# Patient Record
Sex: FEMALE | Race: AMERICAN INDIAN OR ALASKA NATIVE
[De-identification: names, ages, dates, MRNs, and addresses within clinical notes are randomized per-mention and may not be internally consistent; named-entity substitution may affect disease eponyms.]

---

## 2018-02-12 ENCOUNTER — HOSPITAL ENCOUNTER (EMERGENCY)
Dept: HOSPITAL 31 - C.ER | Age: 68
Discharge: HOME | End: 2018-02-12
Payer: MEDICARE

## 2018-02-12 VITALS
RESPIRATION RATE: 20 BRPM | SYSTOLIC BLOOD PRESSURE: 130 MMHG | OXYGEN SATURATION: 100 % | TEMPERATURE: 98.1 F | HEART RATE: 84 BPM | DIASTOLIC BLOOD PRESSURE: 77 MMHG

## 2018-02-12 VITALS — BODY MASS INDEX: 22.1 KG/M2

## 2018-02-12 DIAGNOSIS — R06.02: ICD-10-CM

## 2018-02-12 DIAGNOSIS — R53.1: Primary | ICD-10-CM

## 2018-02-12 LAB
ALBUMIN SERPL-MCNC: 4 G/DL (ref 3.5–5)
ALBUMIN/GLOB SERPL: 0.9 {RATIO} (ref 1–2.1)
ALT SERPL-CCNC: 19 U/L (ref 9–52)
APTT BLD: 27 SECONDS (ref 21–34)
AST SERPL-CCNC: 22 U/L (ref 14–36)
BASOPHILS # BLD AUTO: 0 K/UL (ref 0–0.2)
BASOPHILS NFR BLD: 0 % (ref 0–2)
BUN SERPL-MCNC: 19 MG/DL (ref 7–17)
CALCIUM SERPL-MCNC: 8.6 MG/DL (ref 8.6–10.4)
D DIMER: 2600 NG/MLDDU (ref 0–243)
EOSINOPHIL # BLD AUTO: 0.2 K/UL (ref 0–0.7)
EOSINOPHIL NFR BLD: 2 % (ref 0–4)
EOSINOPHIL NFR BLD: 2 % (ref 0–4)
ERYTHROCYTE [DISTWIDTH] IN BLOOD BY AUTOMATED COUNT: 15.3 % (ref 11.5–14.5)
GFR NON-AFRICAN AMERICAN: 55
HGB BLD-MCNC: 13.4 G/DL (ref 11–16)
INR PPP: 1.1
LYMPHOCYTE: 7 % (ref 20–40)
LYMPHOCYTES # BLD AUTO: 0.5 K/UL (ref 1–4.3)
LYMPHOCYTES NFR BLD AUTO: 6 % (ref 20–40)
MCH RBC QN AUTO: 23 PG (ref 27–31)
MCHC RBC AUTO-ENTMCNC: 32.3 G/DL (ref 33–37)
MCV RBC AUTO: 71.2 FL (ref 81–99)
MONOCYTE: 10 % (ref 0–10)
MONOCYTES # BLD: 0.9 K/UL (ref 0–0.8)
MONOCYTES NFR BLD: 11 % (ref 0–10)
MYELOCYTES NFR BLD: 1 % (ref 0–0)
NEUTROPHILS # BLD: 6.3 K/UL (ref 1.8–7)
NEUTROPHILS NFR BLD AUTO: 80 % (ref 50–75)
NEUTROPHILS NFR BLD AUTO: 81 % (ref 50–75)
NRBC BLD AUTO-RTO: 0 % (ref 0–2)
PLATELET # BLD EST: NORMAL 10*3/UL
PLATELET # BLD: 159 K/UL (ref 130–400)
PMV BLD AUTO: 11.2 FL (ref 7.2–11.7)
PROTHROMBIN TIME: 12 SECONDS (ref 9.7–12.2)
RBC # BLD AUTO: 5.84 MIL/UL (ref 3.8–5.2)
TOTAL CELLS COUNTED BLD: 100
WBC # BLD AUTO: 7.7 K/UL (ref 4.8–10.8)

## 2018-02-12 PROCEDURE — 93005 ELECTROCARDIOGRAM TRACING: CPT

## 2018-02-12 PROCEDURE — 80053 COMPREHEN METABOLIC PANEL: CPT

## 2018-02-12 PROCEDURE — 85378 FIBRIN DEGRADE SEMIQUANT: CPT

## 2018-02-12 PROCEDURE — 85730 THROMBOPLASTIN TIME PARTIAL: CPT

## 2018-02-12 PROCEDURE — 99285 EMERGENCY DEPT VISIT HI MDM: CPT

## 2018-02-12 PROCEDURE — 85025 COMPLETE CBC W/AUTO DIFF WBC: CPT

## 2018-02-12 PROCEDURE — 87804 INFLUENZA ASSAY W/OPTIC: CPT

## 2018-02-12 PROCEDURE — 71275 CT ANGIOGRAPHY CHEST: CPT

## 2018-02-12 PROCEDURE — 85610 PROTHROMBIN TIME: CPT

## 2018-02-12 PROCEDURE — 71045 X-RAY EXAM CHEST 1 VIEW: CPT

## 2018-02-12 NOTE — CT
CTA chest PE protocol 



Indication: SOB  r/o PE



Technique: 



Contiguous axial images were obtained through the chest with 

intravenous contrast enhancement. Sagittal and coronal 

reconstructions were generated and reviewed. 



This CT exam was performed using 1 or more of the following dose 

reduction techniques: Automated exposure control, adjustment of the 

MAA and/or kV according to patient size, and/or use of iterative 

reconstruction technique. 



IV Contrast: 100 mL Visipaque 320 







Radiation dose (DLP): 491.08 MGy-cm. 



Comparison: Chest x-ray performed 2/12/18 



Findings: 



Visualized portions of the inferior thyroid gland appear 

heterogeneous and enlarged with multiple probable nodules. 



The mediastinal and hilar vascular structures appear within normal 

limits. Cardiomegaly. Median sternotomy wires. 



No large central or segmental pulmonary embolus evident.



No focal consolidation. No pleural effusion. No pneumothorax. 3 mm 

probable right apical calcified granuloma. No suspicious pulmonary 

nodules measuring greater than 5 mm. 



Limited visualized portions of the upper abdomen ; 7 mm probable 

splenule.  Otherwise unremarkable unenhanced appearance. 



No acute osseous abnormality is detected. 



Impression: 



Heterogeneous enlarged appearance of the included inferior thyroid 

gland with numerous probable nodules. Correlation with thyroid 

function tests and outpatient thyroid ultrasound recommended for 

further evaluation if indicated. 



No large central or segmental pulmonary embolus identified. 



Cardiomegaly. 



3 mm right apical calcified granuloma.

## 2018-02-12 NOTE — RAD
HISTORY:

shortness of breath  



COMPARISON:

Chest x-ray performed 8/15/16 



TECHNIQUE:

Chest, one view.



FINDINGS:

Examination limited by habitus.



LUNGS:

Small left pleural effusion.  Bibasilar atelectasis/ infiltrates. No 

definite pneumothorax.



Please note that chest x-ray has limited sensitivity for the 

detection of pulmonary masses.



CARDIOVASCULAR:

Median sternotomy wires.  Cardiomegaly.



OSSEOUS STRUCTURES:

 Osseous demineralization.  Degenerative changes.



VISUALIZED UPPER ABDOMEN:

Tenting of the right hemidiaphragm.



OTHER FINDINGS:

None.



IMPRESSION:

Cardiomegaly.  Small left pleural effusion.  Bibasilar atelectasis/ 

infiltrates. Tenting of the right hemidiaphragm.

## 2018-02-12 NOTE — C.PDOC
History Of Present Illness





<Corina Shaw - Last Filed: 18 16:37>





<Megan Dias - Last Filed: 18 14:09>





CC: "weakness"





HPI: 68 year old female with past medical history of mitral valve replacement, 

HTN, and cranial hematoma who presents to the ED for weakness.  She states she 

started to have a general feeling of weakness.  She states what prompted her to 

come to the ED today is that she could not get herself to complete her morning 

routine such as brushing her teeth or taking a shower as she felt very weak.  

She states she has nasal congestion, runny nose, shortness of breath, coughing 

with clear phlegm. She states she started to feel short of breath on Thursday.  

She went and saw Dr. Pelayo who stated for her to stop taking Amlodipine and to 

start taking Valsartan.  She says she can usually walk about a block and half 

with her cane and she takes her time walking up the stairs because of her hip 

revisions and replacements.  She also states she has a headache with pain 

behind her eyes that started on Friday.  She states the pain has been constant 

since Friday and feels like a dull pain.  She denies blurry vision or change in 

vision.  She states she occasionally gets headaches every few months.  She 

denies having migraines.  She states the only thing she has tried for her 

symptoms is vitamin C. She denies fever, chills, night sweats, chest pain, or 

palpitations.  She states her  had similar symptoms prior at home such 

as nasal congestion. She also states she has been off her blood thinner 

Coumadin since her head bleed in . 





PMD: Dr. Pelayo


Past Medical History: HTN


Past Surgical History: Hematoma head ; mitral valve replacement 2013; 

bilateral hip replacement and revisions; back surgery


Medications: Lopressor 50mg bid; Valsartan 10mg daily; Aspirin 81mg


Allergies: NKDA


Social: denies smoking, alcohol or illicit drug use


Family History: Dad heart disease; mom - thyroid and pancreatic cancer (Megan Dias)





<Corina Shaw - Last Filed: 18 16:37>


History Per: Patient


History/Exam Limitations: no limitations





<Megan Dias - Last Filed: 18 14:09>


Chief Complaint (Nursing): Weakness/Neurological Deficit





Past Medical History





- Medical History


PMH: Atrial Fibrillation (Hx of paroxysmal atrial fib.), Cardia Arrhythmia, CVA 

(hemmorhagic), Deep Vein Thrombosis, HTN, Mitral Valve Prolapse, Peripheral 

Edema


   Denies: CHF, Chronic Kidney Disease


Family History: States: Unknown Family Hx





- Social History


Hx Tobacco Use: No


Hx Alcohol Use: No


Hx Substance Use: No





- Immunization History


Hx Tetanus Toxoid Vaccination: Yes


Hx Influenza Vaccination: No


Hx Pneumococcal Vaccination: No





<Megan Dias. - Last Filed: 18 14:09>


Vital Signs: 


 Last Vital Signs











Temp  98.1 F   18 17:28


 


Pulse  84   18 17:28


 


Resp  20   18 17:28


 


BP  130/77   18 17:28


 


Pulse Ox  100   18 17:28














- [facility] Procedures








CLOSURE SKIN & SUBCUTANEOUS NEC (14)


COLONOSCOPY (99)


DX ULTRASOUND-HEART (13)


EXTIRPATION OF MATTER FROM CEREBELLUM, OPEN APPROACH (12/29/15)


INJECT/INFUSE NEC (05)


TRANSFUSE NONAUT FROZEN PLASMA IN PERIPH VEIN, PERC (12/29/15)











Review Of Systems


Constitutional: Negative for: Fever, Chills, Sweats


Eyes: Positive for: Pain.  Negative for: Vision Change


ENT: Positive for: Nose Discharge, Nose Congestion.  Negative for: Ear Pain, 

Ear Discharge, Nose Pain, Throat Pain


Cardiovascular: Negative for: Chest Pain, Palpitations


Respiratory: Positive for: Cough, Shortness of Breath, Sputum


Gastrointestinal: Negative for: Nausea, Vomiting, Abdominal Pain, Diarrhea, 

Constipation


Genitourinary: Negative for: Dysuria


Neurological: Positive for: Weakness, Headache





<Megan Dias S. - Last Filed: 18 14:09>





Physical Exam





- Physical Exam


Appears: No Acute Distress


Skin: Normal Color


Head: Atraumatic, No Tenderness


Eye(s): bilateral: Normal Inspection, PERRL, EOMI


Ear(s): Bilateral: Normal


Nose: No Discharge, No Epistaxis


Oral Mucosa: Moist


Tongue: Normal Appearing


Lips: Normal Appearing


Throat: Normal, No Erythema, No Exudate


Cardiovascular: Rhythm Regular, No Murmur


Respiratory: Normal Breath Sounds, No Accessory Muscle Use, No Rales


Gastrointestinal/Abdominal: Normal Exam, Soft, No Tenderness


Extremity: Normal ROM, No Tenderness, No Pedal Edema


Extremity: Bilateral: Normal ROM


Neurological/Psych: Oriented x3, Normal Speech, Normal Cranial Nerves, Normal 

Motor, Normal Sensation





<Megan Dias - Last Filed: 18 14:09>





ED Course And Treatment





- Laboratory Results


Result Diagrams: 


 18 13:07





 18 13:07


ECG: Interpreted By Me


ECG Rhythm: Sinus Rhythm, 1st Degree HB


Rate From EC


Pulse Ox Interpretation: Normal





<Corina Shaw - Last Filed: 18 16:37>





- Laboratory Results


Result Diagrams: 


 18 13:07





 18 13:07


O2 Sat by Pulse Oximetry: 100





<Megan Dias - Last Filed: 18 14:09>





Supervising Attending Note





- Attestation:


I have personally seen and examined this patient.: Yes


I have fully participated in the care of the patient.: Yes


I have reviewed all pertinent clinical information, including history, physical 

exam and plan: Yes





<Corina Shaw - Last Filed: 18 16:37>





<Megan Dias - Last Filed: 18 14:09>





- Notes:


Notes:: 





GEN WEAKNESS X 4 DAYS. UNABLE TO COMPLETE ROUTINE DAILY ACTIVITY DUE TO 

WEAKNESS. +COUGH, RUNNY NOSE. NO FEVER.  (Corina Shaw)





Progress





- Data Reviewed


Data Reviewed: Lab, Diagnostic imaging, EKG





<Corina Shaw - Last Filed: 18 16:37>





<Megan Dias - Last Filed: 18 14:09>





- Re-Evaluation


Re-evaluation Note: 





18 14:06


EXMA UNCH FROM PRIOR. +DIMER. WILL CTA


18 16:37


NARD APPEARS COMFORTABLE VSS. CTA NEG. NO INDICATION FOR ADMISSION, WILL FU PMD 

THIS WEEK (Corina Shaw)





Medical Decision Making





<Corina Shaw - Last Filed: 18 16:37>





<Megan Dias - Last Filed: 18 14:09>


Medical Decision Making: 





Shortness of breath 


secondary to PE vs. Upper Respiratory Infection vs. Mitral Valve replacement


- f/u influenza


- f/u chest xray


- EKG NSR 90bpm


- f/u cbc, cmp, coags, d-dimer


- ECHO (16): Moderate to severe concentric left ventricular hypertrophy.  

Left ventricle systolic function is mildly impaired. The ejection fraction is 45

-50%. 


  (Megan Dias)





Disposition


Counseled Patient/Family Regarding: Studies Performed, Diagnosis, Need For 

Followup





- Disposition


Disposition Time: 16:37





<Corina Shaw - Last Filed: 18 16:37>





<Megan Dias - Last Filed: 18 14:09>





- Disposition


Disposition: HOME/ ROUTINE


Condition: STABLE


Instructions:  Weakness (ED)


Forms:  [facility] (English)





- Clinical Impression


Clinical Impression: 


 Weakness, Shortness of breath

## 2018-02-14 NOTE — CARD
--------------- APPROVED REPORT --------------





EKG Measurement

Heart [id number]

SC [id number]

[id number]

[id number]

[id number]



<Conclusion>

Sinus rhythm with 1st degree AV block

Otherwise normal ECG